# Patient Record
Sex: FEMALE | Race: BLACK OR AFRICAN AMERICAN | NOT HISPANIC OR LATINO | Employment: PART TIME | URBAN - METROPOLITAN AREA
[De-identification: names, ages, dates, MRNs, and addresses within clinical notes are randomized per-mention and may not be internally consistent; named-entity substitution may affect disease eponyms.]

---
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
## 2020-01-06 ENCOUNTER — HOSPITAL ENCOUNTER (EMERGENCY)
Facility: HOSPITAL | Age: 20
Discharge: HOME | End: 2020-01-06
Attending: PEDIATRICS
Payer: MEDICAID

## 2020-01-06 VITALS
RESPIRATION RATE: 18 BRPM | HEIGHT: 62 IN | BODY MASS INDEX: 29.44 KG/M2 | DIASTOLIC BLOOD PRESSURE: 61 MMHG | TEMPERATURE: 98 F | WEIGHT: 160 LBS | SYSTOLIC BLOOD PRESSURE: 127 MMHG | HEART RATE: 95 BPM | OXYGEN SATURATION: 97 %

## 2020-01-06 DIAGNOSIS — R11.2 NON-INTRACTABLE VOMITING WITH NAUSEA, UNSPECIFIED VOMITING TYPE: Primary | ICD-10-CM

## 2020-01-06 DIAGNOSIS — R19.7 DIARRHEA, UNSPECIFIED TYPE: ICD-10-CM

## 2020-01-06 DIAGNOSIS — B96.89 BACTERIAL VAGINOSIS: ICD-10-CM

## 2020-01-06 DIAGNOSIS — N76.0 BACTERIAL VAGINOSIS: ICD-10-CM

## 2020-01-06 LAB
B-HCG UR QL: NEGATIVE
BACTERIA #/AREA URNS AUTO: ABNORMAL /HPF
BACTERIA GENITAL QL WET PREP: ABNORMAL
BILIRUB UR QL STRIP: NEGATIVE
CLARITY UR REFRACT.AUTO: ABNORMAL
CLUE CELLS VAG QL WET PREP: ABNORMAL
COLOR UR AUTO: YELLOW
CTP QC/QA: YES
FILAMENT FUNGI VAG WET PREP-#/AREA: ABNORMAL
GLUCOSE UR QL STRIP: NEGATIVE
HGB UR QL STRIP: NEGATIVE
KETONES UR QL STRIP: NEGATIVE
LEUKOCYTE ESTERASE UR QL STRIP: ABNORMAL
MICROSCOPIC COMMENT: ABNORMAL
NITRITE UR QL STRIP: NEGATIVE
PH UR STRIP: 5 [PH] (ref 5–8)
PROT UR QL STRIP: NEGATIVE
SP GR UR STRIP: 1.02 (ref 1–1.03)
SPECIMEN SOURCE: ABNORMAL
SQUAMOUS #/AREA URNS AUTO: 11 /HPF
T VAGINALIS GENITAL QL WET PREP: ABNORMAL
URN SPEC COLLECT METH UR: ABNORMAL
WBC #/AREA URNS AUTO: 13 /HPF (ref 0–5)
WBC #/AREA VAG WET PREP: ABNORMAL
YEAST GENITAL QL WET PREP: ABNORMAL

## 2020-01-06 PROCEDURE — 99284 EMERGENCY DEPT VISIT MOD MDM: CPT | Mod: ,,, | Performed by: PEDIATRICS

## 2020-01-06 PROCEDURE — 99284 EMERGENCY DEPT VISIT MOD MDM: CPT | Mod: 25

## 2020-01-06 PROCEDURE — 81025 URINE PREGNANCY TEST: CPT | Performed by: STUDENT IN AN ORGANIZED HEALTH CARE EDUCATION/TRAINING PROGRAM

## 2020-01-06 PROCEDURE — 87210 SMEAR WET MOUNT SALINE/INK: CPT

## 2020-01-06 PROCEDURE — 87591 N.GONORRHOEAE DNA AMP PROB: CPT

## 2020-01-06 PROCEDURE — 25000003 PHARM REV CODE 250: Performed by: PEDIATRICS

## 2020-01-06 PROCEDURE — 87086 URINE CULTURE/COLONY COUNT: CPT

## 2020-01-06 PROCEDURE — 81001 URINALYSIS AUTO W/SCOPE: CPT

## 2020-01-06 PROCEDURE — 99284 PR EMERGENCY DEPT VISIT,LEVEL IV: ICD-10-PCS | Mod: ,,, | Performed by: PEDIATRICS

## 2020-01-06 RX ORDER — ONDANSETRON 4 MG/1
4 TABLET, ORALLY DISINTEGRATING ORAL
Status: DISCONTINUED | OUTPATIENT
Start: 2020-01-06 | End: 2020-01-06

## 2020-01-06 RX ORDER — MEDROXYPROGESTERONE ACETATE 150 MG/ML
150 INJECTION, SUSPENSION INTRAMUSCULAR
COMMUNITY

## 2020-01-06 RX ORDER — ONDANSETRON 4 MG/1
4 TABLET, ORALLY DISINTEGRATING ORAL
Status: COMPLETED | OUTPATIENT
Start: 2020-01-06 | End: 2020-01-06

## 2020-01-06 RX ORDER — IBUPROFEN 600 MG/1
600 TABLET ORAL
Status: COMPLETED | OUTPATIENT
Start: 2020-01-06 | End: 2020-01-06

## 2020-01-06 RX ORDER — ONDANSETRON 4 MG/1
4 TABLET, ORALLY DISINTEGRATING ORAL EVERY 8 HOURS PRN
Qty: 5 TABLET | Refills: 0 | Status: SHIPPED | OUTPATIENT
Start: 2020-01-06

## 2020-01-06 RX ORDER — METRONIDAZOLE 500 MG/1
500 TABLET ORAL 2 TIMES DAILY
Qty: 20 TABLET | Refills: 0 | Status: SHIPPED | OUTPATIENT
Start: 2020-01-06 | End: 2020-01-16

## 2020-01-06 RX ADMIN — IBUPROFEN 600 MG: 600 TABLET, FILM COATED ORAL at 03:01

## 2020-01-06 RX ADMIN — ONDANSETRON 4 MG: 4 TABLET, ORALLY DISINTEGRATING ORAL at 03:01

## 2020-01-06 NOTE — ED TRIAGE NOTES
Patient to ED for evaluation of abdominal and left flank pain that started yesterday.  She states she had 3 episodes of diarrhea yesterday and 3 today. She states she vomited x 1 today.  She denies any pain,pressure or frequency of urination.

## 2020-01-06 NOTE — ED PROVIDER NOTES
"Encounter Date: 2020       History     Chief Complaint   Patient presents with    Abdominal Pain     nvd, think i got food poisoning     Bhupendra is a 18 y/o F here with c/o abdominal pain, diarrhea and vomiting for 1 day. She reports eating "firecrab seafood" yesterday at 3 pm. Then around 4 pm started having abdominal pain, cramping, periumbilical region radiating to back. She also had diarrhea , 6 watery , non-bloody stools until now. Had vomiting X 1 episode this morning,  NBNB. Reports subjective fever this morning. Has not been able to eat anything since yesterday, has been drinking some Sprite. Reports decreased urinary output. At present , reports nausea 10/10 abdominal pain.    Denies any dysuria, vaginal bleeding or vaginal discharge. LMP was 3 months ago, is on Depo injections. Reports being treated for BV 3 weeks ago, completed treatment.  Denied GC/CT in past.  Has been sexually active since then.    PMH: H/O incidental finding during imaging after [facility]  Was told to see Gyn for it, admits to not seeing anyone for this, has been asymptomatic.             PSH: none,  in 2018    All: NKA  Imm: UTD        Review of patient's allergies indicates:  No Known Allergies  No past medical history on file.  No past surgical history on file.  No family history on file.  Social History     Tobacco Use    Smoking status: Never Smoker    Smokeless tobacco: Never Used   Substance Use Topics    Alcohol use: No    Drug use: No     Review of Systems   Constitutional: Positive for activity change, appetite change and fever (subjective). Negative for fatigue.   HENT: Negative for congestion.    Eyes: Negative.    Respiratory: Negative for cough and shortness of breath.    Cardiovascular: Negative for chest pain.   Gastrointestinal: Positive for abdominal pain, diarrhea, nausea and vomiting. Negative for anal bleeding, blood in stool and " constipation.   Genitourinary: Positive for decreased urine volume. Negative for dysuria, frequency, hematuria, menstrual problem, urgency, vaginal bleeding and vaginal discharge.   Musculoskeletal: Negative.    Skin: Negative for pallor and rash.   Allergic/Immunologic: Negative for immunocompromised state.   Neurological: Negative for dizziness, weakness and headaches.       Physical Exam     Initial Vitals [01/06/20 1432]   BP Pulse Resp Temp SpO2   127/61 95 18 98 °F (36.7 °C) 97 %      MAP       --         Physical Exam    Constitutional: She appears well-developed and well-nourished. No distress.   Appears ill, not toxic, well hydrated   HENT:   Head: Normocephalic and atraumatic.   Right Ear: External ear normal.   Left Ear: External ear normal.   Mouth/Throat: Oropharynx is clear and moist. No oropharyngeal exudate.   Eyes: Conjunctivae and EOM are normal. Pupils are equal, round, and reactive to light. Right eye exhibits no discharge. Left eye exhibits no discharge. No scleral icterus.   Neck: Normal range of motion. Neck supple.   Cardiovascular: Normal rate, regular rhythm, normal heart sounds and intact distal pulses. Exam reveals no gallop and no friction rub.    No murmur heard.  Pulmonary/Chest: Breath sounds normal. No respiratory distress.   Abdominal: Soft. Bowel sounds are normal. She exhibits no distension and no mass. There is tenderness. There is no rebound and no guarding.   Tenderness in periumbilical region and suprapubic region, no RLQ or LLQ tenderness, no epigastric or upper quad tenderness   Musculoskeletal: Normal range of motion.   Neurological: She is alert and oriented to person, place, and time.   Skin: Skin is warm and dry. Capillary refill takes less than 2 seconds. No pallor.   Psychiatric: She has a normal mood and affect. Thought content normal.         ED Course   Procedures  Labs Reviewed   URINALYSIS, REFLEX TO URINE CULTURE   POCT URINE PREGNANCY          Imaging Results     None          Medical Decision Making:   History:   Old Records Summarized: records from another hospital.  Initial Assessment:    20 y/o F here with c/o abdominal pain, diarrhea and vomiting for 1 day, after eating seafood. On exam, appears minimally ill, not toxic, well hydrated, feeling nauseous.Tenderness in periumbilical region and lower abdomen       Differential Diagnosis:   Acute Gastroenteritis  ( bacterial Vs viral ) Vs UTI Vs pregnancy Vs BV or STI Vs ovarian torsion ( H/O dermoid tumor)   Clinical Tests:   Lab Tests: Ordered and Reviewed  ED Management:  UPT negative, UA with 13 WBCs, few bacteria, no nitrite, urine culture sent.    Zofran for nausea, ibuprofen for pain given, feeling better but now having mainly suprapubic pain. No lower lateral abd pain, no epigastric or upper quad pain.  H/O treatment 3 weeks ago for BV, sexually active since then.  Patient prefers STI testing (GC/CT alone) and wet prep.  She declines empiric treatment.     Update:  She is now nontender throughout.  She is tolerating PO.  No vomiting.  No suprapubic tenderness.  No lower quad TTP.    GC/CT swab, wet prep done, will follow results and send antibiotics as needed.    Did well with  PO challenge  D/c with prn zofran tabs     Referral sent to Gyn for dermoid tumor f/u; reiterated the importance of following up with OB/GYN as recommended by [facility]    Strict return precautions advised              Attending Attestation:   Physician Attestation Statement for Resident:  As the supervising MD   Physician Attestation Statement: I have personally seen and examined this patient.   I agree with the above history. -:   As the supervising MD I agree with the above PE.    As the supervising MD I agree with the above treatment, course, plan, and disposition.  I have reviewed and agree with the residents interpretation of the following: lab data.  I have reviewed the following: records from a referring facility.                                   Clinical Impression:       ICD-10-CM ICD-9-CM   1. Non-intractable vomiting with nausea, unspecified vomiting type R11.2 787.01   2. Diarrhea, unspecified type R19.7 787.91              Gianna De Paz MD  Resident  01/06/20 2827      UPDATE:  - Wet prep with clue cells, bacteria.  Patient notifed.  Rx faxed to pharmacy of choice.    ATTENDING PHYSICIAN ATTESTATION    I have repeated the key portions of the resident's history and physical, reviewed and agree with the resident medical documentation with my above edits. I supervised and managed the medical care of the patient with this resident.    Aidan Tello MD    Department of Pediatric Emergency Medicine               Aidan Tello MD  01/06/20 1713       Aidan Tello MD  01/06/20 1714

## 2020-01-06 NOTE — ED NOTES
LOC:The patient is awake, alert and cooperative with a calm affect, patient is aware of environment and behaving in an age appropriate manor, patient recognizes caregiver and is speaking appropriately for age.  APPEARANCE: Resting comfortably, in no acute distress, the patient has clean hair, skin and nails, patient's clothing is properly fastened.  RESPIRATORY: Airway is open and patent, respirations are spontaneous, normal respiratory effort and rate noted.   MUSCULOSKELETAL: Patient moving all extremities well, no obvious deformities noted.  SKIN: The skin is warm and dry, patient has normal skin turgor and moist mucus membranes, no breakdown or bruising noted.  ABDOMEN: Soft and non tender in all four quadrants.Bowel sounds present.  SOCIAL: Alone  HEENT: WNL  SOCIAL

## 2020-01-06 NOTE — DISCHARGE INSTRUCTIONS
Continue supportive care at home with oral hydration and Ibuprofen or Tylenol as needed for mild pain.  Seek immediate medical care for any fever, difficulty or noisy breathing, trouble drinking, decreased urine, severe abdominal pain, irritability or any other concerns you may have.     Please schedule an appointment with OB/GYN for removal of incidental dermoid cyst/teratoma found at [facility].

## 2020-01-07 LAB
C TRACH DNA SPEC QL NAA+PROBE: NOT DETECTED
N GONORRHOEA DNA SPEC QL NAA+PROBE: NOT DETECTED

## 2020-01-08 ENCOUNTER — HOSPITAL ENCOUNTER (OUTPATIENT)
Dept: RADIOLOGY | Facility: OTHER | Age: 20
Discharge: HOME | End: 2020-01-08
Attending: NURSE PRACTITIONER
Payer: MEDICAID

## 2020-01-08 ENCOUNTER — OFFICE VISIT (OUTPATIENT)
Dept: OBSTETRICS AND GYNECOLOGY | Facility: CLINIC | Age: 20
End: 2020-01-08
Payer: MEDICAID

## 2020-01-08 VITALS — BODY MASS INDEX: 30.02 KG/M2 | HEIGHT: 62 IN | WEIGHT: 163.13 LBS

## 2020-01-08 DIAGNOSIS — N83.8 OVARIAN MASS, RIGHT: ICD-10-CM

## 2020-01-08 DIAGNOSIS — N83.8 OVARIAN MASS, RIGHT: Primary | ICD-10-CM

## 2020-01-08 LAB
BACTERIA UR CULT: NORMAL
BACTERIA UR CULT: NORMAL

## 2020-01-08 PROCEDURE — 76856 US EXAM PELVIC COMPLETE: CPT | Mod: 26,,, | Performed by: INTERNAL MEDICINE

## 2020-01-08 PROCEDURE — 99203 PR OFFICE/OUTPT VISIT, NEW, LEVL III, 30-44 MIN: ICD-10-PCS | Mod: S$PBB,,, | Performed by: NURSE PRACTITIONER

## 2020-01-08 PROCEDURE — 76856 US EXAM PELVIC COMPLETE: CPT | Mod: TC

## 2020-01-08 PROCEDURE — 99999 PR PBB SHADOW E&M-EST. PATIENT-LVL III: CPT | Mod: PBBFAC,,, | Performed by: NURSE PRACTITIONER

## 2020-01-08 PROCEDURE — 76830 TRANSVAGINAL US NON-OB: CPT | Mod: 26,,, | Performed by: INTERNAL MEDICINE

## 2020-01-08 PROCEDURE — 99203 OFFICE O/P NEW LOW 30 MIN: CPT | Mod: S$PBB,,, | Performed by: NURSE PRACTITIONER

## 2020-01-08 PROCEDURE — 76830 US PELVIS COMP WITH TRANSVAG NON-OB (XPD): ICD-10-PCS | Mod: 26,,, | Performed by: INTERNAL MEDICINE

## 2020-01-08 PROCEDURE — 99999 PR PBB SHADOW E&M-EST. PATIENT-LVL III: ICD-10-PCS | Mod: PBBFAC,,, | Performed by: NURSE PRACTITIONER

## 2020-01-08 PROCEDURE — 99213 OFFICE O/P EST LOW 20 MIN: CPT | Mod: PBBFAC | Performed by: NURSE PRACTITIONER

## 2020-01-08 PROCEDURE — 76856 US PELVIS COMP WITH TRANSVAG NON-OB (XPD): ICD-10-PCS | Mod: 26,,, | Performed by: INTERNAL MEDICINE
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
## 2024-02-14 ENCOUNTER — HOSPITAL ENCOUNTER (EMERGENCY)
Facility: HOSPITAL | Age: 24
Discharge: HOME | End: 2024-02-14
Attending: EMERGENCY MEDICINE
Payer: MEDICAID

## 2024-02-14 VITALS
SYSTOLIC BLOOD PRESSURE: 131 MMHG | OXYGEN SATURATION: 97 % | HEART RATE: 103 BPM | RESPIRATION RATE: 18 BRPM | DIASTOLIC BLOOD PRESSURE: 64 MMHG | TEMPERATURE: 98 F

## 2024-02-14 DIAGNOSIS — U07.1 COVID-19: ICD-10-CM

## 2024-02-14 DIAGNOSIS — J02.0 STREP PHARYNGITIS: Primary | ICD-10-CM

## 2024-02-14 LAB
B-HCG UR QL: NEGATIVE
CTP QC/QA: YES
GROUP A STREP, MOLECULAR: POSITIVE
INFLUENZA A, MOLECULAR: NEGATIVE
INFLUENZA B, MOLECULAR: NEGATIVE
SARS-COV-2 RDRP RESP QL NAA+PROBE: POSITIVE
SPECIMEN SOURCE: NORMAL

## 2024-02-14 PROCEDURE — 87651 STREP A DNA AMP PROBE: CPT | Performed by: EMERGENCY MEDICINE

## 2024-02-14 PROCEDURE — U0002 COVID-19 LAB TEST NON-CDC: HCPCS | Performed by: EMERGENCY MEDICINE

## 2024-02-14 PROCEDURE — 25000003 PHARM REV CODE 250: Performed by: EMERGENCY MEDICINE

## 2024-02-14 PROCEDURE — 81025 URINE PREGNANCY TEST: CPT | Performed by: EMERGENCY MEDICINE

## 2024-02-14 PROCEDURE — 99283 EMERGENCY DEPT VISIT LOW MDM: CPT

## 2024-02-14 PROCEDURE — 87502 INFLUENZA DNA AMP PROBE: CPT | Performed by: EMERGENCY MEDICINE

## 2024-02-14 RX ORDER — ACETAMINOPHEN 500 MG
1000 TABLET ORAL
Status: COMPLETED | OUTPATIENT
Start: 2024-02-14 | End: 2024-02-14

## 2024-02-14 RX ORDER — ACETAMINOPHEN 500 MG
TABLET ORAL
Status: DISPENSED
Start: 2024-02-14 | End: 2024-02-14

## 2024-02-14 RX ORDER — AMOXICILLIN 500 MG/1
1000 TABLET, FILM COATED ORAL DAILY
Qty: 20 TABLET | Refills: 0 | Status: SHIPPED | OUTPATIENT
Start: 2024-02-14 | End: 2024-02-24

## 2024-02-14 RX ADMIN — ACETAMINOPHEN 1000 MG: 500 TABLET ORAL at 04:02

## 2024-02-14 NOTE — ED PROVIDER NOTES
Encounter Date: 2/14/2024       History     Chief Complaint   Patient presents with    Nasal Congestion     Patient reports has had near constant nasal congestion for 2 days. Reports has tried Dayquila dn Nyquil without much relief. Unsure of any sick contacts.     Bhupendra Saldivar is a 23-year-old female no significant past medical history presenting today for nasal congestion, mild sore throat and occasional cough since Monday.  She has tried DayQuil and NyQuil.  No known sick contacts.      Review of patient's allergies indicates:  No Known Allergies  History reviewed. No pertinent past medical history.  History reviewed. No pertinent surgical history.  History reviewed. No pertinent family history.  Social History     Tobacco Use    Smoking status: Never    Smokeless tobacco: Never   Substance Use Topics    Alcohol use: No    Drug use: No     Review of Systems    Physical Exam     Initial Vitals [02/14/24 0322]   BP Pulse Resp Temp SpO2   131/64 103 18 98.2 °F (36.8 °C) 97 %      MAP       --         Physical Exam    Nursing note and vitals reviewed.  Constitutional: She appears well-developed and well-nourished. No distress.   HENT:   Mild posterior oropharyngeal erythema without exudates.  Uvula midline.   Cardiovascular:  Normal rate and regular rhythm.           Pulmonary/Chest: Breath sounds normal. She has no wheezes. She has no rales.           ED Course   Procedures  Labs Reviewed   GROUP A STREP, MOLECULAR - Abnormal; Notable for the following components:       Result Value    Group A Strep, Molecular Positive (*)     All other components within normal limits   SARS-COV-2 RNA AMPLIFICATION, QUAL - Abnormal; Notable for the following components:    SARS-CoV-2 RNA, Amplification, Qual Positive (*)     All other components within normal limits   INFLUENZA A & B BY MOLECULAR   POCT URINE PREGNANCY          Imaging Results    None          Medications   acetaminophen tablet 1,000 mg (1,000 mg Oral Given 2/14/24  0410)     Medical Decision Making  Urgent evaluation of 23-year-old female presenting today for nasal congestion, mild sore throat and cough.    Afebrile, mild tachycardia 103, BP stable.    Differential includes not limited to COVID, flu, strep pharyngitis, low suspicion for peritonsillar abscess, pneumonia based on exam.    Plan for viral testing, strep test.  Patient treated with ibuprofen.    Amount and/or Complexity of Data Reviewed  Labs: ordered. Decision-making details documented in ED Course.    Risk  OTC drugs.               ED Course as of 02/14/24 0436 Wed Feb 14, 2024 0411 Group A Strep, Molecular(!): Positive [GM]   0431 SARS-CoV-2 RNA, Amplification, Qual(!): Positive [GM]      ED Course User Index  [GM] Rozina Moyer MD          Discussed results with patient.  Will send prescription for amoxicillin to the pharmacy.  No indication for antiviral therapy as she does not have any comorbidities that place her at high risk for severe illness.  Continue over-the-counter therapy with Tylenol/ibuprofen/cold and flu.  Return to emergency department for any concerning symptom.                 Clinical Impression:  Final diagnoses:  [J02.0] Strep pharyngitis (Primary)  [U07.1] COVID-19          ED Disposition Condition    Discharge Stable          ED Prescriptions       Medication Sig Dispense Start Date End Date Auth. Provider    amoxicillin (AMOXIL) 500 MG Tab Take 2 tablets (1,000 mg total) by mouth once daily. for 10 days 20 tablet 2/14/2024 2/24/2024 Rozina Moyer MD          Follow-up Information       Follow up With Specialties Details Why Contact Info    Estee Murillo NP Family Medicine Schedule an appointment as soon as possible for a visit   [address] [facility] [address]  [phone]               Rozina Moyer MD  02/14/24 0436

## 2024-02-14 NOTE — DISCHARGE INSTRUCTIONS
Your COVID and strep test were both positive.  Strep is typically treated with antibiotics.  I have sent a prescription for amoxicillin to your pharmacy.  It is best intake with food.  You do not require any antivirals for your COVID infection as you do not have any medical problems that police you at high-risk for issues.    Please follow-up with the primary doctor if you continued to have any worsening issues.  Continue over-the-counter medications such as Tylenol/flu/DayQuil/NyQuil as needed for symptom control.

## 2024-02-14 NOTE — ED TRIAGE NOTES
Bhupendra Saldivar, a 23 y.o. female presents to the ED w/ complaint of nasal congestion and sore throat for several days, Day/NyQuil not working     Triage note:  Chief Complaint   Patient presents with    Nasal Congestion     Patient reports has had near constant nasal congestion for 2 days. Reports has tried Dayquila dn Nyquil without much relief. Unsure of any sick contacts.     Review of patient's allergies indicates:  No Known Allergies  No past medical history on file.